# Patient Record
Sex: MALE | Race: ASIAN
[De-identification: names, ages, dates, MRNs, and addresses within clinical notes are randomized per-mention and may not be internally consistent; named-entity substitution may affect disease eponyms.]

---

## 2021-04-15 ENCOUNTER — HOSPITAL ENCOUNTER (EMERGENCY)
Dept: HOSPITAL 5 - ED | Age: 51
Discharge: LEFT BEFORE DISCHARGE | End: 2021-04-15
Payer: SELF-PAY

## 2021-04-15 VITALS — SYSTOLIC BLOOD PRESSURE: 152 MMHG | DIASTOLIC BLOOD PRESSURE: 83 MMHG

## 2021-04-15 DIAGNOSIS — Z53.21: ICD-10-CM

## 2021-04-15 DIAGNOSIS — Z20.2: Primary | ICD-10-CM

## 2021-08-25 ENCOUNTER — HOSPITAL ENCOUNTER (EMERGENCY)
Dept: HOSPITAL 5 - ED | Age: 51
Discharge: LEFT BEFORE DISCHARGE | End: 2021-08-25
Payer: SELF-PAY

## 2021-08-25 DIAGNOSIS — Z53.21: ICD-10-CM

## 2021-08-25 DIAGNOSIS — Z01.818: Primary | ICD-10-CM

## 2022-05-04 ENCOUNTER — HOSPITAL ENCOUNTER (EMERGENCY)
Dept: HOSPITAL 5 - ED | Age: 52
Discharge: LEFT BEFORE DISCHARGE | End: 2022-05-04
Payer: SELF-PAY

## 2022-05-04 VITALS — SYSTOLIC BLOOD PRESSURE: 156 MMHG | DIASTOLIC BLOOD PRESSURE: 87 MMHG

## 2022-05-04 DIAGNOSIS — J44.9: ICD-10-CM

## 2022-05-04 DIAGNOSIS — I10: ICD-10-CM
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
## 2022-05-04 DIAGNOSIS — F17.200: ICD-10-CM

## 2022-05-04 DIAGNOSIS — T40.5X1A: Primary | ICD-10-CM

## 2022-05-04 PROCEDURE — 99283 EMERGENCY DEPT VISIT LOW MDM: CPT

## 2022-05-04 PROCEDURE — 71045 X-RAY EXAM CHEST 1 VIEW: CPT

## 2022-05-04 NOTE — XRAY REPORT
. XR chest 1V ap



INDICATION / CLINICAL INFORMATION: Altered Mental Status.



COMPARISON: None available.



FINDINGS:



SUPPORT DEVICES: None.

HEART /PULMONARY VASCULATURE: Heart is enlarged. Pulmonary vasculature is congested. 

LUNGS / PLEURA: Patchy mixed interstitial and airspace opacities are present throughout the lungs. No
dular opacities are noted within the left hilar region and right midlung. No sizable pleural effusion
. 



IMPRESSION:

Patchy mixed interstitial and airspace opacities throughout the lungs, may reflect pulmonary edema or
 pneumonia. Nodular opacities in bilateral lungs may reflect focal infiltrates, though pulmonary nodu
les are not excluded. Consider further evaluation with CT.



Signer Name: Zachery Rubio MD 

Signed: 5/4/2022 10:05 PM

Workstation Name: [email or web address]
